# Patient Record
Sex: FEMALE | Race: WHITE | NOT HISPANIC OR LATINO | Employment: OTHER | URBAN - METROPOLITAN AREA
[De-identification: names, ages, dates, MRNs, and addresses within clinical notes are randomized per-mention and may not be internally consistent; named-entity substitution may affect disease eponyms.]

---

## 2023-11-16 PROBLEM — I48.21 PERMANENT ATRIAL FIBRILLATION (MULTI): Status: ACTIVE | Noted: 2023-11-16

## 2023-11-17 ENCOUNTER — OFFICE VISIT (OUTPATIENT)
Dept: CARDIOLOGY | Facility: CLINIC | Age: 83
End: 2023-11-17
Payer: MEDICARE

## 2023-11-17 VITALS
BODY MASS INDEX: 33.12 KG/M2 | DIASTOLIC BLOOD PRESSURE: 80 MMHG | HEIGHT: 67 IN | SYSTOLIC BLOOD PRESSURE: 126 MMHG | WEIGHT: 211 LBS

## 2023-11-17 DIAGNOSIS — I48.21 PERMANENT ATRIAL FIBRILLATION (MULTI): Primary | ICD-10-CM

## 2023-11-17 PROCEDURE — 1159F MED LIST DOCD IN RCRD: CPT | Performed by: INTERNAL MEDICINE

## 2023-11-17 PROCEDURE — 99213 OFFICE O/P EST LOW 20 MIN: CPT | Performed by: INTERNAL MEDICINE

## 2023-11-17 RX ORDER — ROSUVASTATIN CALCIUM 20 MG/1
20 TABLET, COATED ORAL
COMMUNITY
Start: 2023-03-14

## 2023-11-17 RX ORDER — FUROSEMIDE 20 MG/1
20 TABLET ORAL DAILY
COMMUNITY
Start: 2023-09-18

## 2023-11-17 RX ORDER — LEVOTHYROXINE SODIUM 100 UG/1
100 TABLET ORAL
COMMUNITY

## 2023-11-17 RX ORDER — ZOLEDRONIC ACID 4 MG/5ML
INJECTION INTRAVENOUS
COMMUNITY

## 2023-11-17 RX ORDER — METOPROLOL TARTRATE 25 MG/1
25 TABLET, FILM COATED ORAL EVERY 12 HOURS
COMMUNITY

## 2023-11-17 RX ORDER — ACETAMINOPHEN AND CODEINE PHOSPHATE 300; 30 MG/1; MG/1
1 TABLET ORAL EVERY 8 HOURS PRN
COMMUNITY

## 2023-11-17 RX ORDER — APIXABAN 5 MG/1
TABLET, FILM COATED ORAL
COMMUNITY
End: 2024-04-16 | Stop reason: SDUPTHER

## 2023-11-17 NOTE — PATIENT INSTRUCTIONS
OK to hold Eliquis 2-3 days before dental surgery.    Follow up with Dr. Ramicone in 10-12 months.

## 2023-11-18 NOTE — ASSESSMENT & PLAN NOTE
1.  Permanent atrial fibrillation: The ventricular response is well controlled on metoprolol.  The patient will remain on Eliquis for stroke risk reduction.  She is not a candidate for rhythm control strategies.  Follow-up in 10 to 12 months.

## 2024-04-16 DIAGNOSIS — I48.21 PERMANENT ATRIAL FIBRILLATION (MULTI): ICD-10-CM

## 2024-04-16 RX ORDER — APIXABAN 5 MG/1
5 TABLET, FILM COATED ORAL 2 TIMES DAILY
Qty: 180 TABLET | Refills: 3 | Status: SHIPPED | OUTPATIENT
Start: 2024-04-16 | End: 2025-04-16

## 2024-08-15 PROBLEM — E78.5 HYPERLIPIDEMIA, UNSPECIFIED: Status: ACTIVE | Noted: 2022-05-24

## 2024-09-03 PROBLEM — I48.21 PERMANENT ATRIAL FIBRILLATION (MULTI): Chronic | Status: ACTIVE | Noted: 2023-11-16

## 2024-09-03 NOTE — PROGRESS NOTES
"History Of Present Illness:      This is a an 84-year-old female with a history of atrial fibrillation.  She has no complaints of palpitations, chest pain, or shortness of breath.  She is taking metoprolol for control of the atrial fibrillation and she has been on Eliquis for stroke risk reduction.  No other cardiac complaints at this time.      Review of Systems  Other review of systems negative     Last Recorded Vitals:      12/8/2021     9:31 AM 12/9/2021     5:29 PM 11/15/2022     2:01 PM 12/7/2022     9:23 AM 8/22/2023     9:09 AM 11/17/2023     2:35 PM 9/4/2024    10:28 AM   Vitals   Systolic 140 124 115 132 130 126 136   Diastolic 70 80 62 76 74 80 76   Heart Rate 59  101 103 101  61   Height (in) 1.74 m (5' 8.5\")  1.74 m (5' 8.5\") 1.74 m (5' 8.5\") 1.74 m (5' 8.5\") 1.702 m (5' 7\") 1.702 m (5' 7\")   Weight (lb) 217  197 197 200.5 211 203   BMI 32.51 kg/m2  29.52 kg/m2 29.52 kg/m2 30.04 kg/m2 33.05 kg/m2 31.79 kg/m2   BSA (m2) 2.18 m2  2.08 m2 2.08 m2 2.1 m2 2.13 m2 2.09 m2   Visit Report      Report Report     Allergies:  Penicillin and Warfarin    Outpatient Medications:  Current Outpatient Medications   Medication Instructions    acetaminophen-codeine (Tylenol w/ Codeine #3) 300-30 mg tablet 1 tablet, oral, Every 8 hours PRN    cholecalciferol (VITAMIN D-3) 2,000 Units, oral, Daily RT    DULoxetine (CYMBALTA) 30 mg, oral, Daily RT    Eliquis 5 mg, oral, 2 times daily    levothyroxine (SYNTHROID, LEVOXYL) 100 mcg, oral, Daily before breakfast    lisinopriL-hydrochlorothiazide 10-12.5 mg tablet 0.5 tablets, oral, Daily RT    metoprolol tartrate (LOPRESSOR) 12.5 mg, oral, Every 12 hours    rosuvastatin (CRESTOR) 20 mg, oral, Daily RT    zoledronic acid (Zometa) 4 mg/5 mL injection intravenous     Physical Exam:    General Appearance:  Alert, oriented, no distress  Skin:  Warm and dry  Head and Neck:  No elevation of JVP, no carotid bruits  Cardiac Exam:  Rhythm is regular, S1 and S2 are normal, no murmur S3 or " S4  Lungs:  Clear to auscultation  Extremities:  no edema  Neurologic:  No focal deficits  Psychiatric:  Appropriate mood and behavior    Cardiology Tests:  I have personally review the diagnostic cardiac testing and my interpretation is as follows:    EKG: Atrial fibrillation, right bundle branch block, controlled ventricular response  Echocardiogram 2015: Ejection fraction 60%      Assessment/Plan   Problem List Items Addressed This Visit             ICD-10-CM    Permanent atrial fibrillation (Multi) - Primary (Chronic) I48.21     1.  Permanent atrial fibrillation: The ventricular response remains under good control on metoprolol.  Continue Eliquis for anticoagulation.  The patient has been asymptomatic.  No further cardiac testing is recommended at this time.  Follow-up in 10 to 12 months.         Relevant Medications    metoprolol tartrate (Lopressor) 25 mg tablet    Other Relevant Orders    ECG 12 lead (Clinic Performed) (Completed)     James C Ramicone, DO

## 2024-09-04 ENCOUNTER — APPOINTMENT (OUTPATIENT)
Dept: CARDIOLOGY | Facility: CLINIC | Age: 84
End: 2024-09-04
Payer: MEDICARE

## 2024-09-04 VITALS
HEIGHT: 67 IN | WEIGHT: 203 LBS | BODY MASS INDEX: 31.86 KG/M2 | SYSTOLIC BLOOD PRESSURE: 136 MMHG | HEART RATE: 61 BPM | DIASTOLIC BLOOD PRESSURE: 76 MMHG

## 2024-09-04 DIAGNOSIS — I48.21 PERMANENT ATRIAL FIBRILLATION (MULTI): Primary | ICD-10-CM

## 2024-09-04 PROCEDURE — 93005 ELECTROCARDIOGRAM TRACING: CPT | Performed by: INTERNAL MEDICINE

## 2024-09-04 PROCEDURE — 99213 OFFICE O/P EST LOW 20 MIN: CPT | Performed by: INTERNAL MEDICINE

## 2024-09-04 PROCEDURE — 1159F MED LIST DOCD IN RCRD: CPT | Performed by: INTERNAL MEDICINE

## 2024-09-04 PROCEDURE — 93010 ELECTROCARDIOGRAM REPORT: CPT | Performed by: INTERNAL MEDICINE

## 2024-09-04 RX ORDER — METOPROLOL TARTRATE 25 MG/1
12.5 TABLET, FILM COATED ORAL EVERY 12 HOURS
Status: SHIPPED
Start: 2024-09-04

## 2024-09-04 RX ORDER — LISINOPRIL AND HYDROCHLOROTHIAZIDE 10; 12.5 MG/1; MG/1
1 TABLET ORAL
COMMUNITY
Start: 2018-07-12 | End: 2024-09-04 | Stop reason: DRUGHIGH

## 2024-09-04 RX ORDER — DULOXETIN HYDROCHLORIDE 30 MG/1
30 CAPSULE, DELAYED RELEASE ORAL
COMMUNITY
Start: 2024-08-26 | End: 2024-11-24

## 2024-09-04 RX ORDER — LISINOPRIL AND HYDROCHLOROTHIAZIDE 10; 12.5 MG/1; MG/1
0.5 TABLET ORAL
Status: SHIPPED
Start: 2024-09-04

## 2024-09-04 RX ORDER — CHOLECALCIFEROL (VITAMIN D3) 50 MCG
2000 TABLET ORAL
COMMUNITY

## 2024-09-04 NOTE — ASSESSMENT & PLAN NOTE
1.  Permanent atrial fibrillation: The ventricular response remains under good control on metoprolol.  Continue Eliquis for anticoagulation.  The patient has been asymptomatic.  No further cardiac testing is recommended at this time.  Follow-up in 10 to 12 months.

## 2025-03-28 DIAGNOSIS — I48.21 PERMANENT ATRIAL FIBRILLATION (MULTI): Primary | Chronic | ICD-10-CM

## 2025-04-01 RX ORDER — METOPROLOL TARTRATE 25 MG/1
12.5 TABLET, FILM COATED ORAL EVERY 12 HOURS
Qty: 90 TABLET | Refills: 3 | Status: SHIPPED | OUTPATIENT
Start: 2025-04-01

## 2025-05-12 DIAGNOSIS — I48.21 PERMANENT ATRIAL FIBRILLATION (MULTI): ICD-10-CM

## 2025-05-12 RX ORDER — APIXABAN 5 MG/1
5 TABLET, FILM COATED ORAL 2 TIMES DAILY
Qty: 180 TABLET | Refills: 3 | Status: SHIPPED | OUTPATIENT
Start: 2025-05-12 | End: 2026-05-12

## 2025-07-14 PROBLEM — E66.811 OBESITY, CLASS I, BMI 30-34.9: Status: ACTIVE | Noted: 2024-02-26

## 2025-08-03 NOTE — PROGRESS NOTES
"    History Of Present Illness:      This is an 84-year-old female with a history of atrial fibrillation.  The patient reports no complaints of palpitations, chest pain, shortness of breath.  She has had no bleeding or bruising issues on Eliquis.    Review of Systems  Other review of systems negative  Last Recorded Vitals:      12/8/2021     9:31 AM 12/9/2021     5:29 PM 11/15/2022     2:01 PM 12/7/2022     9:23 AM 8/22/2023     9:09 AM 11/17/2023     2:35 PM 9/4/2024    10:28 AM   Vitals   Systolic 140 124 115 132 130 126 136   Diastolic 70 80 62 76 74 80 76   BP Location      Left arm Right arm   Heart Rate 59  101 103 101  61   Height 1.74 m (5' 8.5\")  1.74 m (5' 8.5\") 1.74 m (5' 8.5\") 1.74 m (5' 8.5\") 1.702 m (5' 7\") 1.702 m (5' 7\")   Weight (lb) 217  197 197 200.5 211 203   BMI 32.51 kg/m2  29.52 kg/m2 29.52 kg/m2 30.04 kg/m2 33.05 kg/m2 31.79 kg/m2   BSA (m2) 2.18 m2  2.08 m2 2.08 m2 2.1 m2 2.13 m2 2.09 m2   Visit Report      Report Report     Allergies:  Penicillin and Warfarin  Outpatient Medications:  Current Outpatient Medications   Medication Instructions    acetaminophen-codeine (Tylenol w/ Codeine #3) 300-30 mg tablet 1 tablet, oral, Every 8 hours PRN    cholecalciferol (VITAMIN D-3) 2,000 Units, oral, Daily RT    DULoxetine (CYMBALTA) 30 mg, oral, Daily RT    Eliquis 5 mg, oral, 2 times daily    levothyroxine (SYNTHROID, LEVOXYL) 100 mcg, oral, Daily before breakfast    lisinopriL-hydrochlorothiazide 10-12.5 mg tablet 0.5 tablets, oral, Daily RT    metoprolol tartrate (LOPRESSOR) 12.5 mg, oral, Every 12 hours    rosuvastatin (CRESTOR) 20 mg, oral, Daily RT    zoledronic acid (Zometa) 4 mg/5 mL injection intravenous       Physical Exam:    General Appearance:  Alert, oriented, no distress  Skin:  Warm and dry  Head and Neck:  No elevation of JVP, no carotid bruits  Cardiac Exam:  Rhythm is irregular, S1 and S2 are normal, no murmur S3 or S4  Lungs:  Clear to auscultation  Extremities:  no " edema  Neurologic:  No focal deficits  Psychiatric:  Appropriate mood and behavior    Lab Results:    CMP:  Recent Labs     07/12/23  2037 02/10/23  1113 05/22/22  1422 05/20/22  1552 05/20/22  0840 05/19/22  0401 05/17/22 1956 01/13/20  1154    138 136 136 138 135* 134* 136   K 3.4* 4.6 4.3 4.0 4.0 3.9 4.3 4.5    100 97* 99 101 100 100 99   CO2 29 33* 30 25 28 26 32 28   ANIONGAP 11 10 13 16 13 13 6* 14   BUN 17 16 14 15 12 15 17 18   CREATININE 0.93 0.76 0.67 0.68 0.60 0.64 0.70 0.72   MG 2.09  --  1.82  --  2.05 2.14  --   --      Recent Labs     07/12/23  2037 02/10/23  1113 05/22/22  1422 05/20/22  1552 05/20/22  0840 05/19/22 0401 05/17/22 1956   ALBUMIN 4.1 4.2 4.0 3.4 3.6   < > 3.9   ALKPHOS 53 76 69 65  --   --  55   ALT 21 12 14 12  --   --  14   AST 21 15 19 18  --   --  16   BILITOT 0.5 0.7 0.6 0.7  --   --  0.4    < > = values in this interval not displayed.     CBC:  Recent Labs     07/12/23  2037 02/10/23  1113 05/22/22  1422 05/20/22  1552 05/20/22 0840 05/19/22 0401 05/18/22  0833 05/17/22 1956   WBC 8.9 7.9 10.7 11.6* 9.1 9.3 10.3 8.2   HGB 13.1 13.4 13.3 13.1 13.6 12.4 13.1 13.1   HCT 40.4 41.2 41.9 40.9 41.4 37.8 38.0 40.7    295 270 256 251 248 261 257   MCV 90 89 95 92 91 90 86 91     COAG:   Recent Labs     07/12/23 2037 05/20/22  1552 05/17/22  1956   INR 1.2* 1.3* 1.0     Cardiology Tests (personally reviewed):      I have personally review the diagnostic cardiac testing and my interpretation is as follows:     EKG: Atrial fibrillation, right bundle branch block, controlled ventricular response  Echocardiogram 2015: Ejection fraction 60%    Echocardiogram December 2022: Normal left ventricular systolic function  Assessment/Plan   Problem List Items Addressed This Visit           ICD-10-CM    Permanent atrial fibrillation (Multi) - Primary (Chronic) I48.21    The ventricular response is well-controlled on low-dose metoprolol 12.5 mg twice daily.  The atrial  fibrillation is permanent and attempts at rhythm control are not recommended.  The patient is tolerating anticoagulation well and has not had any excessive bruising or bleeding issues.  EP office follow-up in 1 year.         Essential (primary) hypertension I10    Blood pressure readings have been controlled on the current dose of lisinopril HCT.            James C Ramicone, DO

## 2025-08-04 ENCOUNTER — APPOINTMENT (OUTPATIENT)
Dept: CARDIOLOGY | Facility: CLINIC | Age: 85
End: 2025-08-04
Payer: MEDICARE

## 2025-08-04 VITALS
OXYGEN SATURATION: 95 % | WEIGHT: 205 LBS | BODY MASS INDEX: 32.11 KG/M2 | HEART RATE: 70 BPM | SYSTOLIC BLOOD PRESSURE: 112 MMHG | DIASTOLIC BLOOD PRESSURE: 74 MMHG

## 2025-08-04 DIAGNOSIS — I48.21 PERMANENT ATRIAL FIBRILLATION (MULTI): Primary | Chronic | ICD-10-CM

## 2025-08-04 DIAGNOSIS — I10 ESSENTIAL (PRIMARY) HYPERTENSION: ICD-10-CM

## 2025-08-04 PROCEDURE — 99214 OFFICE O/P EST MOD 30 MIN: CPT | Performed by: INTERNAL MEDICINE

## 2025-08-04 PROCEDURE — 3074F SYST BP LT 130 MM HG: CPT | Performed by: INTERNAL MEDICINE

## 2025-08-04 PROCEDURE — 1159F MED LIST DOCD IN RCRD: CPT | Performed by: INTERNAL MEDICINE

## 2025-08-04 PROCEDURE — 99212 OFFICE O/P EST SF 10 MIN: CPT

## 2025-08-04 PROCEDURE — 3078F DIAST BP <80 MM HG: CPT | Performed by: INTERNAL MEDICINE

## 2025-08-04 RX ORDER — DULOXETIN HYDROCHLORIDE 60 MG/1
1 CAPSULE, DELAYED RELEASE ORAL
COMMUNITY
Start: 2025-07-28

## 2025-08-04 NOTE — ASSESSMENT & PLAN NOTE
The ventricular response is well-controlled on low-dose metoprolol 12.5 mg twice daily.  The atrial fibrillation is permanent and attempts at rhythm control are not recommended.  The patient is tolerating anticoagulation well and has not had any excessive bruising or bleeding issues.  EP office follow-up in 1 year.